# Patient Record
Sex: MALE | Race: ASIAN | NOT HISPANIC OR LATINO
[De-identification: names, ages, dates, MRNs, and addresses within clinical notes are randomized per-mention and may not be internally consistent; named-entity substitution may affect disease eponyms.]

---

## 2022-10-12 ENCOUNTER — APPOINTMENT (OUTPATIENT)
Dept: CARDIOLOGY | Facility: CLINIC | Age: 57
End: 2022-10-12

## 2022-10-12 VITALS
HEIGHT: 68 IN | SYSTOLIC BLOOD PRESSURE: 114 MMHG | BODY MASS INDEX: 27.74 KG/M2 | TEMPERATURE: 97.8 F | DIASTOLIC BLOOD PRESSURE: 75 MMHG | RESPIRATION RATE: 18 BRPM | HEART RATE: 69 BPM | OXYGEN SATURATION: 98 % | WEIGHT: 183 LBS

## 2022-10-12 DIAGNOSIS — R00.1 BRADYCARDIA, UNSPECIFIED: ICD-10-CM

## 2022-10-12 DIAGNOSIS — R94.31 ABNORMAL ELECTROCARDIOGRAM [ECG] [EKG]: ICD-10-CM

## 2022-10-12 PROBLEM — Z00.00 ENCOUNTER FOR PREVENTIVE HEALTH EXAMINATION: Status: ACTIVE | Noted: 2022-10-12

## 2022-10-12 PROCEDURE — 93015 CV STRESS TEST SUPVJ I&R: CPT

## 2022-10-12 PROCEDURE — 99203 OFFICE O/P NEW LOW 30 MIN: CPT | Mod: 25

## 2022-10-12 NOTE — ASSESSMENT
[FreeTextEntry1] : 57 year old male with no significant PMH who presents for evaluation of abnormal ECG.\par \par Pt states that he works as a  and likes to exercise. He states he feels well without chest pain/SOB. Denies any dizziness, lightheadedness or LOC. \par \par ECG reviewed showing sinus bradycardia, non-specific ST-T wave changes. \par \par 1) abnormal ECG, sinus bradycardia\par - I advised exercise treadmill stress to assess functional capacity and chronotropic competence\par - Treadmill stress 10/12/22 - pt exercised 9:30 min of [employer] protocol without symptoms, ECG negative for ischemia (0.5mm upsloping ST changes). He had hypertensive response (SBP > 200 but normal diastolic response)\par - pt states that he used to be on one anti-hypertensive medication, I advised him to monitor his blood pressures at home for now\par \par 2) Follow-up, 1 year or if new symptoms

## 2022-10-12 NOTE — HISTORY OF PRESENT ILLNESS
[FreeTextEntry1] : 57 year old male with no significant PMH who presents for evaluation of abnormal ECG.\par \par Pt states that he works as a  and likes to exercise. He states he feels well without chest pain/SOB. Denies any dizziness, lightheadedness or LOC. \par \par ECG reviewed showing sinus bradycardia, non-specific ST-T wave changes. \par \par

## 2022-10-12 NOTE — REASON FOR VISIT
[Symptom and Test Evaluation] : symptom and test evaluation [Arrhythmia/ECG Abnorrmalities] : arrhythmia/ECG abnormalities [FreeTextEntry3] : Dr. Turpin